# Patient Record
(demographics unavailable — no encounter records)

---

## 2024-10-25 NOTE — ASSESSMENT
[FreeTextEntry1] : ASSESSMENT: The patient comes in today with history of injury to the right scaphoid which happened on September 9.  At this stage this is around 7 weeks from today's visit.  Examination is reassuring as he has 0 discomfort when compressing on the scaphoid both volarly and dorsally.  With this in mind I still had a thorough very lengthy conversation with the patient regarding scaphoid fractures nonweightbearing proper immobilization activity modification at least for the next 2 months ideally.  We also discussed treatment options such as surgery.  In the setting of no discomfort whatsoever on examination it is hard to proceed with an ORIF.  At this stage the patient also would like to proceed with nonoperative care.  We have discussed close follow-up.  He does have a second opinion next week which I do encourage as well.  We have discussed risk of nonunion collapse AVN arthritis persistent pain need for subsequent surgery and others.  He verbalized complete understanding   The patient was adequately and thoroughly informed of my assessment of their current condition(s).  - This may diminish bodily function for the extremity. We discussed prognosis, tx modalities including operative and nonoperative options for the above diagnostic assessment. As always, 2nd opinion is always provided as an option.  When accessible, I was able to review other physicians note(s) including reviewing other tests, imaging results as well as personally view these results for my own interpretation.   The patient was adequately and thoroughly informed of my assessment of their current condition(s).  DISCUSSION: 1.  I have thoroughly reviewed and reviewed imaging with patient physical examination treatment options.  Nonoperative and operative modalities.  We discussed proper immobilization we discussed nonweightbearing.  We discussed risk and benefits of all of the above.  Patient verbalized complete understanding. 2.  He states he does have a second opinion in next week which I do encourage.  3. [x]

## 2024-10-25 NOTE — HISTORY OF PRESENT ILLNESS
[FreeTextEntry1] : Shai is a 30-year-old male presenting today after sustaining an injury while running or his thumb got caught on his pocket he states.  At that time he states he was present with a physician assistant who told him to continue with activity modification as per patient.  However on today's visit he specifically says that he has some difficulty with activities that require gripping.  He however states that does not have discomfort at this stage in general.  He does present with a thumb spica brace.  States has been wearing this for a few days now.  He tells me that he is scheduled for a second opinion next week as well

## 2024-10-25 NOTE — PHYSICAL EXAM
[de-identified] : On examination of the right wrist I am compressing significantly at the level of the dorsum of the scaphoid at the snuffbox as well as at the tubercle anteriorly and the patient tells me that he is in no discomfort with assessment.  Mullen is grossly negative.  The patient is not guarding on examination. [de-identified] : [4] views of [bilateral hands and wrists] were obtained today in my office and were seen by me and discussed with the patient.  These do show a right sided scaphoid fracture waist that has not displaced.  Do not see obvious signs of AVN proximally which is reassuring